# Patient Record
Sex: MALE | Employment: UNEMPLOYED | URBAN - METROPOLITAN AREA
[De-identification: names, ages, dates, MRNs, and addresses within clinical notes are randomized per-mention and may not be internally consistent; named-entity substitution may affect disease eponyms.]

---

## 2019-05-10 ENCOUNTER — HOSPITAL ENCOUNTER (EMERGENCY)
Facility: CLINIC | Age: 34
Discharge: HOME | End: 2019-05-10
Attending: EMERGENCY MEDICINE | Admitting: EMERGENCY MEDICINE
Payer: MEDICAID

## 2019-05-10 ENCOUNTER — APPOINTMENT (OUTPATIENT)
Dept: GENERAL RADIOLOGY | Facility: CLINIC | Age: 34
End: 2019-05-10
Attending: EMERGENCY MEDICINE
Payer: MEDICAID

## 2019-05-10 VITALS
OXYGEN SATURATION: 99 % | DIASTOLIC BLOOD PRESSURE: 56 MMHG | TEMPERATURE: 98.6 F | SYSTOLIC BLOOD PRESSURE: 94 MMHG | HEART RATE: 68 BPM

## 2019-05-10 DIAGNOSIS — R07.9 CHEST PAIN, UNSPECIFIED TYPE: ICD-10-CM

## 2019-05-10 DIAGNOSIS — S23.9XXA THORACIC BACK SPRAIN, INITIAL ENCOUNTER: ICD-10-CM

## 2019-05-10 LAB
ALBUMIN SERPL-MCNC: 4.3 G/DL (ref 3.4–5)
ALP SERPL-CCNC: 58 U/L (ref 40–150)
ALT SERPL W P-5'-P-CCNC: 19 U/L (ref 0–70)
ANION GAP SERPL CALCULATED.3IONS-SCNC: 7 MMOL/L (ref 3–14)
AST SERPL W P-5'-P-CCNC: 13 U/L (ref 0–45)
BASOPHILS # BLD AUTO: 0.1 10E9/L (ref 0–0.2)
BASOPHILS NFR BLD AUTO: 0.9 %
BILIRUB SERPL-MCNC: 0.6 MG/DL (ref 0.2–1.3)
BUN SERPL-MCNC: 15 MG/DL (ref 7–30)
CALCIUM SERPL-MCNC: 8.8 MG/DL (ref 8.5–10.1)
CHLORIDE SERPL-SCNC: 103 MMOL/L (ref 94–109)
CO2 SERPL-SCNC: 28 MMOL/L (ref 20–32)
CREAT SERPL-MCNC: 1.09 MG/DL (ref 0.66–1.25)
D DIMER PPP FEU-MCNC: <0.3 UG/ML FEU (ref 0–0.5)
DIFFERENTIAL METHOD BLD: NORMAL
EOSINOPHIL # BLD AUTO: 0.1 10E9/L (ref 0–0.7)
EOSINOPHIL NFR BLD AUTO: 1.1 %
ERYTHROCYTE [DISTWIDTH] IN BLOOD BY AUTOMATED COUNT: 13 % (ref 10–15)
GFR SERPL CREATININE-BSD FRML MDRD: 90 ML/MIN/{1.73_M2}
GLUCOSE SERPL-MCNC: 93 MG/DL (ref 70–99)
HCT VFR BLD AUTO: 41.6 % (ref 40–53)
HGB BLD-MCNC: 13.9 G/DL (ref 13.3–17.7)
IMM GRANULOCYTES # BLD: 0 10E9/L (ref 0–0.4)
IMM GRANULOCYTES NFR BLD: 0.3 %
INTERPRETATION ECG - MUSE: NORMAL
LYMPHOCYTES # BLD AUTO: 2.3 10E9/L (ref 0.8–5.3)
LYMPHOCYTES NFR BLD AUTO: 28.5 %
MCH RBC QN AUTO: 30.1 PG (ref 26.5–33)
MCHC RBC AUTO-ENTMCNC: 33.4 G/DL (ref 31.5–36.5)
MCV RBC AUTO: 90 FL (ref 78–100)
MONOCYTES # BLD AUTO: 0.4 10E9/L (ref 0–1.3)
MONOCYTES NFR BLD AUTO: 5.1 %
NEUTROPHILS # BLD AUTO: 5.1 10E9/L (ref 1.6–8.3)
NEUTROPHILS NFR BLD AUTO: 64.1 %
NRBC # BLD AUTO: 0 10*3/UL
NRBC BLD AUTO-RTO: 0 /100
PLATELET # BLD AUTO: 248 10E9/L (ref 150–450)
POTASSIUM SERPL-SCNC: 3.6 MMOL/L (ref 3.4–5.3)
PROT SERPL-MCNC: 7.6 G/DL (ref 6.8–8.8)
RBC # BLD AUTO: 4.62 10E12/L (ref 4.4–5.9)
SODIUM SERPL-SCNC: 138 MMOL/L (ref 133–144)
TROPONIN I SERPL-MCNC: <0.015 UG/L (ref 0–0.04)
WBC # BLD AUTO: 8 10E9/L (ref 4–11)

## 2019-05-10 PROCEDURE — 72072 X-RAY EXAM THORAC SPINE 3VWS: CPT

## 2019-05-10 PROCEDURE — 85379 FIBRIN DEGRADATION QUANT: CPT | Performed by: EMERGENCY MEDICINE

## 2019-05-10 PROCEDURE — 25000128 H RX IP 250 OP 636: Performed by: EMERGENCY MEDICINE

## 2019-05-10 PROCEDURE — 71046 X-RAY EXAM CHEST 2 VIEWS: CPT

## 2019-05-10 PROCEDURE — 99285 EMERGENCY DEPT VISIT HI MDM: CPT | Mod: 25

## 2019-05-10 PROCEDURE — 84484 ASSAY OF TROPONIN QUANT: CPT | Performed by: EMERGENCY MEDICINE

## 2019-05-10 PROCEDURE — 85025 COMPLETE CBC W/AUTO DIFF WBC: CPT | Performed by: EMERGENCY MEDICINE

## 2019-05-10 PROCEDURE — 80053 COMPREHEN METABOLIC PANEL: CPT | Performed by: EMERGENCY MEDICINE

## 2019-05-10 PROCEDURE — 96361 HYDRATE IV INFUSION ADD-ON: CPT

## 2019-05-10 PROCEDURE — 93005 ELECTROCARDIOGRAM TRACING: CPT

## 2019-05-10 PROCEDURE — 96374 THER/PROPH/DIAG INJ IV PUSH: CPT

## 2019-05-10 RX ORDER — KETOROLAC TROMETHAMINE 15 MG/ML
15 INJECTION, SOLUTION INTRAMUSCULAR; INTRAVENOUS ONCE
Status: COMPLETED | OUTPATIENT
Start: 2019-05-10 | End: 2019-05-10

## 2019-05-10 RX ORDER — IBUPROFEN 600 MG/1
600 TABLET, FILM COATED ORAL EVERY 6 HOURS PRN
Qty: 30 TABLET | Refills: 0 | Status: SHIPPED | OUTPATIENT
Start: 2019-05-10

## 2019-05-10 RX ORDER — HYDROCODONE BITARTRATE AND ACETAMINOPHEN 5; 325 MG/1; MG/1
1 TABLET ORAL EVERY 6 HOURS PRN
Qty: 18 TABLET | Refills: 0 | Status: SHIPPED | OUTPATIENT
Start: 2019-05-10 | End: 2019-05-13

## 2019-05-10 RX ORDER — SODIUM CHLORIDE 9 MG/ML
1000 INJECTION, SOLUTION INTRAVENOUS CONTINUOUS
Status: DISCONTINUED | OUTPATIENT
Start: 2019-05-10 | End: 2019-05-10 | Stop reason: HOSPADM

## 2019-05-10 RX ADMIN — SODIUM CHLORIDE 1000 ML: 9 INJECTION, SOLUTION INTRAVENOUS at 10:46

## 2019-05-10 RX ADMIN — KETOROLAC TROMETHAMINE 15 MG: 15 INJECTION, SOLUTION INTRAMUSCULAR; INTRAVENOUS at 10:46

## 2019-05-10 SDOH — HEALTH STABILITY: MENTAL HEALTH: HOW OFTEN DO YOU HAVE A DRINK CONTAINING ALCOHOL?: NEVER

## 2019-05-10 ASSESSMENT — ENCOUNTER SYMPTOMS
SHORTNESS OF BREATH: 1
BACK PAIN: 1
CONFUSION: 0
COUGH: 0
FEVER: 0
NAUSEA: 0
VOMITING: 0

## 2019-05-10 NOTE — ED NOTES
Bed: ED19  Expected date: 5/10/19  Expected time:   Means of arrival:   Comments:    33 M  [id number]  Chest pain inter[reter

## 2019-05-10 NOTE — DISCHARGE INSTRUCTIONS
You will be sore!    Ice to the area.    No return to work until cleared by PCP  Motrin (ie ibuprofen) or tylenol for mild pain.  Norco (ie tylenol with narcotic) for severe pain.  Followup with your doctor in the next few days.

## 2019-05-10 NOTE — ED AVS SNAPSHOT
[facility] Emergency Department  [address]  [facility] [address]  Phone:  [phone]  Fax:  [phone]                                    Steve Clifford   MRN: [id number]    Department:  [facility] Emergency Department   Date of Visit:  5/10/2019           After Visit Summary Signature Page    I have received my discharge instructions, and my questions have been answered. I have discussed any challenges I see with this plan with the nurse or doctor.    ..........................................................................................................................................  Patient/Patient Representative Signature      ..........................................................................................................................................  Patient Representative Print Name and Relationship to Patient    ..................................................               ................................................  Date                                   Time    ..........................................................................................................................................  Reviewed by Signature/Title    ...................................................              ..............................................  Date                                               Time          22EPIC Rev 08/18

## 2019-05-10 NOTE — ED PROVIDER NOTES
History     Chief Complaint:  Chest & Back Pain    The history is provided by the patient. The history is limited by a language barrier. A  was used.      Steve Clifford is a 33 year old right-handed male who presents with one hour of slight of substernal and left-sided chest pain that radiates posteriorly into his upper left back. He affirms his pain is worse with deep inspirations and acknowledges dyspnea. Patient works as a  and was unloading equipment off the lift onto the roof with sudden onset of the pain.  He notes similar episode of pain though less severe four days ago that resolved spontaneously. He states his pain have been triggered during deep inspirations. He denies any nausea, recent strain/fall, recent travel, cough, personal history of asthma, diabetes, hypertension, and prior surgeries, or family history of CAD/PE/DVT.  No nausea/vomiting.  No fevers.  No confusion or syncope.  Patient comes in by EMS.    Allergies:  No Known Drug Allergies    Medications:    Medications reviewed. No current medications.     Past Medical History:    Medical history reviewed. No pertinent medical history.    Past Surgical History:    Surgical history reviewed. No pertinent surgical history.    Family History:    Family history reviewed. No pertinent family history.     Social History:  Presents via EMS    Review of Systems   Constitutional: Negative for fever.   Respiratory: Positive for shortness of breath. Negative for cough.    Cardiovascular: Positive for chest pain.   Gastrointestinal: Negative for nausea and vomiting.   Musculoskeletal: Positive for back pain.   Neurological: Negative for syncope.   Psychiatric/Behavioral: Negative for confusion.   All other systems reviewed and are negative.  SOB and pain in left chest wall with movement, radiating into the thoracic area.  SOB, no palpitations.  Worse with movement and breathing.  No trauma but patient was working manually at the  time.  No recent sickness or cough.    Physical Exam     Patient Vitals for the past 24 hrs:   BP Temp Temp src Pulse Heart Rate SpO2   05/10/19 1245 94/56 -- -- 68 72 99 %   05/10/19 1225 97/57 -- -- 61 58 98 %   05/10/19 1200 99/55 -- -- 63 65 99 %   05/10/19 1045 100/64 98.6  F (37  C) Oral 68 89 97 %     Physical Exam   Pulmonary/Chest:               GEN: patient appears tired,  utilized  HEAD: atraumatic, normocephalic  EYES: pupils reactive (3plus to 1plus bilaterally), extraocular muscles intact, conjunctivae normal  ENT: TMs flat and white bilaterally, oropharynx normal with no erythema or exudate, mucus membranes moist, floor of mouth is soft  NECK: no cervical LAD. No meningeal signs  RESPIRATORY: no tachypnea, breath sounds clear to auscultation (no rales, wheezes, rhonchi), chest wall tender to palpation left upper side but no crepitance, normal phonation  CVS: normal S1/S2, no murmurs/rubs/gallops  ABDOMEN: soft, nontender, no masses or organomegaly, no rebound, positive bowel sounds  BACK: no costovertebral angle tenderness, no spinal tenderness, left shoulder with tenderness along thoracic paraspinous area to palpation and with movement of the left arm  EXTREMITIES: intact pulses x 4, full range of motion at joints, no edema  MUSCULOSKELETAL: no deformities, muscle spasm left posterior scapula and lateral thoracic area  SKIN: warm and dry, no acute rashes   NEURO: GCS 15, cranial nerves intact.  Motor- moves all 4 extremities with 5/5 strength,  5/5.  DF and PF 5/5.  Sensation- intact  Coordination- romberg negative, normal tandem gait, no ataxia.  Overall symmetrical exam.  Abduction of the left shoulder 4/5 due to pain but 5/5 on the right side.  HEME: no bruising or petechiae/contusions  LYMPH: no lymphadenopathy    Emergency Department Course     ECG:  ECG taken at 1009, ECG read at 1009  Normal sinus rhythm  Normal ECG  Rate 85 bpm. ID interval 156 ms. QRS duration 96 ms.  QT/QTc 354/421 ms. P-R-T axes 43 51 42.    Imaging:  Thoracic spine XR, 3 views  There is at least one small right lung nodule. In light of the patient's age, this is of unlikely clinical significance, assuming no primary malignancy. Minimal scoliosis. Otherwise unremarkable.  ANTONIO DE LA GARZA MD  Reading per radiology    Chest XR,  PA & LAT  There is at least one small right lung nodule. In light of the patient's age, this is of unlikely clinical significance, assuming no primary malignancy. Minimal scoliosis. Otherwise unremarkable.  ANTONIO DE LA GARZA MD  Reading per radiology    Laboratory:  CBC: WBC 8.0, HGB 13.9,   CMP: WNL (Creatinine 1.09)  Troponin (Collected 1013): <0.015  D dimer: <0.3    Interventions:  1046: NS 1L IV Bolus   1046: Toradol 15 mg IV  Heplock  Cardiac/Sp02 monitoring      Emergency Department Course:  1009 Nursing notes and vitals reviewed. I performed an exam of the patient as documented above. EKG obtained in the ED, see results above.   1013 IV was inserted and blood was drawn for laboratory testing, results above.  11am  Recheck  1136 The patient was sent for a x-ray while in the emergency department, results above.   1:00 PM recheck, . I personally reviewed the imaging and laboratory results with the patient and answered all related questions prior to discharge, anticipatory guidance given.    BP 94/56   Pulse 68   Temp 98.6  F (37  C) (Oral)   SpO2 99%     Discussed results with patient.  Gave patient copies of all results (applicable labs, CT scans and/or ultrasounds).  Answered questions.  Asked patient to followup with PCP.  Circled any abnormal lab values and asked patient to followup with PCP.     used    Impression & Plan      Medical Decision Making:  Steve is a 33 year old gentleman who was working at his work site when he started to have discomfort on the left side of his chest that felt like a muscle twinge that radiates into his left  back and shoulder blade. On examination, the patient did had muscle spasms and tenderness. Patient came in by EMS, pre-hospital EKG was normal and his EKG here does not show any evidence for any obvious ST elevation or myocardial infarction. He stated it hurt to take a deep breath.  IV was placed and labs sent.  Patient placed on cardiac monitor and no hypoxia or tachycardia.    His CBC was normal. His comprehensive metabolic panel was normal. Troponin did not show any sign of ischemia and D dimer was negative, showing no evidence of pulmonary embolism. We did discuss the case several times with the patient via a . He was given Toradol IV and we did use a  several times. At the time of discharge, he was feeling slightly better. He works in a physically demanding job as a  and he was given off work and copy of his studies to expedite follow up in the next couple days. He understands it will be painful. He will continue to hydrate and push fluid.    Patient given a card for [facility].  No return to work until cleared by PCP.  Ice to the area.    Diagnosis:    ICD-10-CM   1. Chest pain, unspecified type R07.9   2. Thoracic back sprain, initial encounter S23.9XXA     Disposition: Home    Discharge Medications:  HYDROcodone-acetaminophen 5-325 MG tablet  Commonly known as:  NORCO      Dose:  1 tablet  Take 1 tablet by mouth every 6 hours as needed for pain  Quantity:  18 tablet  Refills:  0     ibuprofen 600 MG tablet  Commonly known as:  ADVIL/MOTRIN      Dose:  600 mg  Take 1 tablet (600 mg) by mouth every 6 hours as needed for moderate pain  Quantity:  30 tablet  Refills:  0       Instructions to patient:  You will be sore!    Ice to the area.  Motrin (ie ibuprofen) or tylenol for mild pain.  Norco (ie tylenol with narcotic) for severe pain.  Followup with your doctor in the next few days.  No return to work until cleared.    Scribe Disclosure:  ITejas, am  serving as a scribe at 10:11 AM on 5/10/2019 to document services personally performed by Evy Muñoz MD based on my observations and the provider's statements to me.    [facility] EMERGENCY DEPARTMENT       Evy Muñoz MD  05/11/19 0908

## 2019-05-10 NOTE — ED NOTES
Patient arrived at around 10:00 complaining of chest pain that started this morning substernally and moved to the left upper back while at work. Patient reports increased pain with deep breaths and increased pain with palpation of right upper chest. Patient denies fall or trauma. Works as a  and has a physically demanding job. Reports some shortness of breath. Denies/fever chills. ABCs intact. Alert and oriented X4. Oriented to room and call light. Patient educated about hand hygiene practices.

## 2020-07-13 ENCOUNTER — APPOINTMENT (OUTPATIENT)
Dept: GENERAL RADIOLOGY | Facility: CLINIC | Age: 35
End: 2020-07-13
Attending: EMERGENCY MEDICINE
Payer: OTHER MISCELLANEOUS

## 2020-07-13 ENCOUNTER — HOSPITAL ENCOUNTER (EMERGENCY)
Facility: CLINIC | Age: 35
Discharge: HOME | End: 2020-07-13
Attending: EMERGENCY MEDICINE | Admitting: EMERGENCY MEDICINE
Payer: OTHER MISCELLANEOUS

## 2020-07-13 VITALS
DIASTOLIC BLOOD PRESSURE: 84 MMHG | SYSTOLIC BLOOD PRESSURE: 113 MMHG | RESPIRATION RATE: 18 BRPM | HEART RATE: 89 BPM | TEMPERATURE: 97.9 F | OXYGEN SATURATION: 99 %

## 2020-07-13 DIAGNOSIS — R07.89 CHEST WALL PAIN: ICD-10-CM

## 2020-07-13 LAB
ANION GAP SERPL CALCULATED.3IONS-SCNC: 5 MMOL/L (ref 3–14)
BASOPHILS # BLD AUTO: 0.1 10E9/L (ref 0–0.2)
BASOPHILS NFR BLD AUTO: 0.8 %
BUN SERPL-MCNC: 10 MG/DL (ref 7–30)
CALCIUM SERPL-MCNC: 8.8 MG/DL (ref 8.5–10.1)
CHLORIDE SERPL-SCNC: 107 MMOL/L (ref 94–109)
CO2 SERPL-SCNC: 27 MMOL/L (ref 20–32)
CREAT SERPL-MCNC: 0.8 MG/DL (ref 0.66–1.25)
D DIMER PPP FEU-MCNC: 0.3 UG/ML FEU (ref 0–0.5)
DIFFERENTIAL METHOD BLD: NORMAL
EOSINOPHIL # BLD AUTO: 0.2 10E9/L (ref 0–0.7)
EOSINOPHIL NFR BLD AUTO: 2.4 %
ERYTHROCYTE [DISTWIDTH] IN BLOOD BY AUTOMATED COUNT: 13.1 % (ref 10–15)
GFR SERPL CREATININE-BSD FRML MDRD: >90 ML/MIN/{1.73_M2}
GLUCOSE SERPL-MCNC: 98 MG/DL (ref 70–99)
HCT VFR BLD AUTO: 42.5 % (ref 40–53)
HGB BLD-MCNC: 14.2 G/DL (ref 13.3–17.7)
IMM GRANULOCYTES # BLD: 0 10E9/L (ref 0–0.4)
IMM GRANULOCYTES NFR BLD: 0.1 %
LYMPHOCYTES # BLD AUTO: 2.3 10E9/L (ref 0.8–5.3)
LYMPHOCYTES NFR BLD AUTO: 32.1 %
MCH RBC QN AUTO: 29.3 PG (ref 26.5–33)
MCHC RBC AUTO-ENTMCNC: 33.4 G/DL (ref 31.5–36.5)
MCV RBC AUTO: 88 FL (ref 78–100)
MONOCYTES # BLD AUTO: 0.5 10E9/L (ref 0–1.3)
MONOCYTES NFR BLD AUTO: 7.1 %
NEUTROPHILS # BLD AUTO: 4.1 10E9/L (ref 1.6–8.3)
NEUTROPHILS NFR BLD AUTO: 57.5 %
NRBC # BLD AUTO: 0 10*3/UL
NRBC BLD AUTO-RTO: 0 /100
PLATELET # BLD AUTO: 229 10E9/L (ref 150–450)
POTASSIUM SERPL-SCNC: 4.1 MMOL/L (ref 3.4–5.3)
RBC # BLD AUTO: 4.84 10E12/L (ref 4.4–5.9)
SODIUM SERPL-SCNC: 139 MMOL/L (ref 133–144)
TROPONIN I SERPL-MCNC: <0.015 UG/L (ref 0–0.04)
WBC # BLD AUTO: 7.2 10E9/L (ref 4–11)

## 2020-07-13 PROCEDURE — 85025 COMPLETE CBC W/AUTO DIFF WBC: CPT | Performed by: EMERGENCY MEDICINE

## 2020-07-13 PROCEDURE — 85379 FIBRIN DEGRADATION QUANT: CPT | Performed by: EMERGENCY MEDICINE

## 2020-07-13 PROCEDURE — 25000132 ZZH RX MED GY IP 250 OP 250 PS 637: Performed by: EMERGENCY MEDICINE

## 2020-07-13 PROCEDURE — 93005 ELECTROCARDIOGRAM TRACING: CPT

## 2020-07-13 PROCEDURE — 80048 BASIC METABOLIC PNL TOTAL CA: CPT | Performed by: EMERGENCY MEDICINE

## 2020-07-13 PROCEDURE — 84484 ASSAY OF TROPONIN QUANT: CPT | Performed by: EMERGENCY MEDICINE

## 2020-07-13 PROCEDURE — 99285 EMERGENCY DEPT VISIT HI MDM: CPT | Mod: 25

## 2020-07-13 PROCEDURE — 71046 X-RAY EXAM CHEST 2 VIEWS: CPT

## 2020-07-13 RX ORDER — ACETAMINOPHEN 325 MG/1
975 TABLET ORAL ONCE
Status: COMPLETED | OUTPATIENT
Start: 2020-07-13 | End: 2020-07-13

## 2020-07-13 RX ORDER — IBUPROFEN 600 MG/1
600 TABLET, FILM COATED ORAL ONCE
Status: COMPLETED | OUTPATIENT
Start: 2020-07-13 | End: 2020-07-13

## 2020-07-13 RX ORDER — CYCLOBENZAPRINE HCL 10 MG
5-10 TABLET ORAL 3 TIMES DAILY PRN
Qty: 20 TABLET | Refills: 0 | Status: SHIPPED | OUTPATIENT
Start: 2020-07-13

## 2020-07-13 RX ADMIN — ACETAMINOPHEN 975 MG: 325 TABLET, FILM COATED ORAL at 14:16

## 2020-07-13 RX ADMIN — IBUPROFEN 600 MG: 600 TABLET, FILM COATED ORAL at 14:16

## 2020-07-13 ASSESSMENT — ENCOUNTER SYMPTOMS
SHORTNESS OF BREATH: 1
ABDOMINAL PAIN: 0
BACK PAIN: 1
COUGH: 0
FEVER: 0

## 2020-07-13 NOTE — ED TRIAGE NOTES
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  States that every day since then he has had chest pain all over his chest.  Pain is worse with a deep breath or when he moves his arms.  States that he has \"severe tingling to both arms and shoulders when I lift my arms and taking a deep breath makes my chest hurt more.\"  Patient stated that he was prescribed some medication last year but he never filled the prescriptions because he did not know how to.  He has not been taking any OTC pain medications because he takes other medications and does not want to take pain meds also.  ABCDs intact.    "

## 2020-07-13 NOTE — ED AVS SNAPSHOT
[facility] Emergency Department  [address]  [facility] [address]  Phone:  [phone]  Fax:  [phone]                                    Steve Clifford   MRN: [id number]    Department:  [facility] Emergency Department   Date of Visit:  7/13/2020           After Visit Summary Signature Page    I have received my discharge instructions, and my questions have been answered. I have discussed any challenges I see with this plan with the nurse or doctor.    ..........................................................................................................................................  Patient/Patient Representative Signature      ..........................................................................................................................................  Patient Representative Print Name and Relationship to Patient    ..................................................               ................................................  Date                                   Time    ..........................................................................................................................................  Reviewed by Signature/Title    ...................................................              ..............................................  Date                                               Time          22EPIC Rev 08/18

## 2020-07-13 NOTE — ED PROVIDER NOTES
History     Chief Complaint:  Chest pain     The history is provided by the patient. The history is limited by a language barrier. A  was used.      Steve Clifford is a 34 year old male who presents with chest pain. 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 He reports that since that time the chest pain and back pain have not resolved. He describes the chest pain as left sided which worsens with exertion and deep breaths. The patient has been treating the symptoms with Ibuprofen with his last dose on Saturday 07/11/2020. He denies any fever, cough or abdominal pain.     Allergies:  No known drug allergies    Medications:    The patient is not currently taking any prescribed medications.    Past Medical History:    The patient does not have any past pertinent medical history.    Past Surgical History:    The patient does not have any past pertinent medical history.    Family History:    History reviewed. No pertinent family history.     Social History:  Smoking status: no  Alcohol use: no  Drug use: no  The patient presents to the emergency department alone by personal vehicle     PCP: No Ref-Primary, Physician    Marital Status:   [2]    Review of Systems   Constitutional: Negative for fever.   Respiratory: Positive for shortness of breath. Negative for cough.    Cardiovascular: Positive for chest pain.   Gastrointestinal: Negative for abdominal pain.   Musculoskeletal: Positive for back pain.   All other systems reviewed and are negative.    Physical Exam     Patient Vitals for the past 24 hrs:   BP Temp Temp src Pulse Resp SpO2   07/13/20 1325 113/84 97.9  F (36.6  C) Oral 89 18 99 %       Physical Exam  General: Patient is awake, alert and interactive when I enter the room  Head: The scalp, face, and head appear normal  Eyes: The pupils are equal, round, and reactive to light.  Conjunctivae and sclerae are normal  Neck: Normal range of motion. No anterior cervical lymphadenopathy noted  CV: Regular rate. S1/S2. No murmurs. Peripheral pulses including radial pulses are symmetric.   Resp: Lungs are clear without wheezes or rales. No respiratory distress.   GI: Abdomen is soft, no rigidity, guarding, or rebound. No distension. No tenderness to palpation in any quadrant.     MS: Chest wall is tender to palpation on the left side. Normal tone. Joints grossly normal without effusions. No asymmetric leg swelling, calf or thigh tenderness.    Skin: No rash or lesions noted. Normal capillary refill noted  Neuro: Speech is normal and fluent. Face is symmetric. Moving all extremities.   Psych:  Normal affect.  Appropriate interactions.    Emergency Department Course   ECG (13:40:26):  Rate 65 bpm. OH interval 144. QRS duration 96. QT/QTc 372/386. P-R-T axes 14 63 44. Normal sinus rhythm. Normal EKG. Interpreted at 1356 by Deepak Ortiz MD.    Imaging:  Radiology findings were communicated with the patient who voiced understanding of the findings.    XR Chest 2 Views  IMPRESSION: No acute pulmonary disease.   As read by Radiology.    Laboratory:  Laboratory findings were communicated with the patient who voiced understanding of the findings.    CBC: WNL (WBC 7.2, HGB 14.2, )     Troponin I (1401): <0.015    D-dimer: <0.3    BMP: AWNL (Creatinine 0.80)    Interventions:  1416 Tylenol 975 mg PO   1416 Advil 600 mg PO    Emergency Department Course:  Past medical records, nursing notes, and vitals reviewed.  1359: I performed an exam of the patient and obtained history, as documented above.     EKG was obtained and reviewed in the emergency department.    IV inserted and blood drawn.    The patient was sent for a Chest XR while in the emergency department, results above.     I rechecked the patient. I reviewed the results with the Patient and answered all related questions prior to  discharge.     Findings and plan explained to the Patient. Patient discharged home with instructions regarding supportive care, medications, and reasons to return. The importance of close follow-up was reviewed. The patient was prescribed Flexeril.     Impression & Plan   Medical Decision Making:  Steve Clifford is a 34 year old male who presents with chest pain.  Patient has been having chronic chest pain along the left side of his chest wall radiating through to his back since he fell off a roof onto some blacktop a year ago.  It sounds like he had a lung contusion and concussion at the time but no other serious traumatic findings. I considered a broad differential diagnosis in this patient including life-threatening etiologies such as acute coronary syndrome, myocardial infarction, pulmonary embolism, acute aortic dissection, myocarditis, pericarditis, acute valvular insufficiency amongst others.  Other causes considered for this patient included pneumonia, pneumothorax, chest wall source, pericarditis, pleurisy, esophageal spasm, etc.  No serious etiology for the chest pain were detected today during this visit.  EKG was obtained which showed no signs of ischemia nor dysrhythmia.  Troponin is negative.  D-dimer is negative making PE unlikely.  This is more than likely chronic musculoskeletal pain due to his trauma.  Patient will continue to use ibuprofen and Tylenol at home and will be given a prescription for some Flexeril.  Close follow up with primary care is indicated should the pain continue, as further work up may be performed; this was made clear to the patient, who understands.  I also referred the patient to physical therapy for further treatment.    Diagnosis:    ICD-10-CM    1. Chest wall pain  R07.89      Disposition:  Discharged to home.    Discharge Medications:  Discharge Medication List as of 7/13/2020  3:16 PM      START taking these medications    Details   cyclobenzaprine (FLEXERIL) 10 MG  tablet Take 0.5-1 tablets (5-10 mg) by mouth 3 times daily as needed for muscle spasms, Disp-20 tablet,R-0, Local Print         Katelyn Landa  7/13/2020   [facility] EMERGENCY DEPARTMENT  Scribe Disclosure:  I, Katelyn Landa, am serving as a scribe at 1:59 PM on 7/13/2020 to document services personally performed by Deepak Ortiz MD based on my observations and the provider's statements to me.        Deepak Ortiz MD  07/13/20 3319

## 2020-07-14 LAB — INTERPRETATION ECG - MUSE: NORMAL
